# Patient Record
(demographics unavailable — no encounter records)

---

## 2024-10-16 NOTE — ASSESSMENT
[FreeTextEntry1] : Doing well.   Neurologically intact on exam today.  Shunt setting remains at 0.5.   I will see her back in 6 months.   Will call barring any issues.  Jazmin Valencia MS PA-C Senior Physician Assistant [facility]    eJanine Raygoza MD FAANS Chair, Department of Neurosurgery [facility]

## 2024-10-16 NOTE — HISTORY OF PRESENT ILLNESS
[FreeTextEntry1] : Ms. GHOTRA was last seen 8/2023.   S/p [employer]  shunt placement performed by Dr. Cheney in 2012. Postoperatively she has done well.    She presents today to have her  shunt setting checked. Current shunt setting is 0.5.   Shunt pumps and refills well. Well healed incisions. Gait is steady. Ambulating with a cane. No acute complaints.

## 2024-11-12 NOTE — HISTORY OF PRESENT ILLNESS
[de-identified] : Patient is a 60-year-old female here for reevaluation bilateral knee.  Denies numbness or tingling, denies significant debility.  Patient states that she still is pain comes and goes, doing well with cortisone injections.  Interested in repeat cortisone injections bilateral knees  b/l knee exam: No effusion no ecchymosis no erythema tenderness palpation to medial/lateral joint line, range of motion 0 degrees to 115 degrees of mild discomfort, no gross instability neurovascular intact  Reviewed prior knee x-rays in detail  Plan for cortisone injection b/l knee  Anesthesia: ethyl chloride sprayed topically.    Dexamethasone 10mg/mL 1 cc   Lidocaine 2% 2cc         Medication was injected in the b/l  knee after verbal consent using sterile preparation and technique. The risks, benefits, and alternatives to cortisone injection were explained in full to the patient. Risks outlined include but are not limited to infection, sepsis, bleeding, scarring, skin discoloration, temporary increase in pain, syncopal episode, failure to resolve symptoms, allergic reaction, symptom recurrence, and elevation of blood sugar in diabetics. Patient understood the risks. All questions were answered. After discussion of options, patient requested an injection. Oral informed consent was obtained and sterile prep was done of the injection site. Sterile technique was utilized for the procedure including the preparation of the solutions used for the injection. Patient tolerated the procedure well. Advised to ice the injection site this evening.  Patient will follow-up in 4 to 6 months for reevaluation.  Call if any questions or concerns.  Will discuss possible viscosupplementation injections.  Patient currently not interested in having Visco injections

## 2025-03-14 NOTE — HISTORY OF PRESENT ILLNESS
[de-identified] : Patient is a 61-year-old female here for reevaluation bilateral knee.  Denies numbness or tingling, denies significant debility.  Patient states that she still is pain comes and goes, doing well with cortisone injections, pain has been coming back for about a month or so.  Interested in repeat cortisone injections bilateral knees  b/l knee exam: No effusion no ecchymosis no erythema tenderness palpation to medial/lateral joint line, range of motion 0 degrees to 115 degrees of mild discomfort, no gross instability neurovascular intact  Reviewed prior knee x-rays in detail  Patient is having flareup of osteoarthritis.  Plan for cortisone injection b/l knee  Anesthesia: ethyl chloride sprayed topically.    Dexamethasone 10mg/mL 2 cc   Lidocaine 2% 2cc         Medication was injected in the b/l  knee after verbal consent using sterile preparation and technique. The risks, benefits, and alternatives to cortisone injection were explained in full to the patient. Risks outlined include but are not limited to infection, sepsis, bleeding, scarring, skin discoloration, temporary increase in pain, syncopal episode, failure to resolve symptoms, allergic reaction, symptom recurrence, and elevation of blood sugar in diabetics. Patient understood the risks. All questions were answered. After discussion of options, patient requested an injection. Oral informed consent was obtained and sterile prep was done of the injection site. Sterile technique was utilized for the procedure including the preparation of the solutions used for the injection. Patient tolerated the procedure well. Advised to ice the injection site this evening.  Patient will follow-up in 4 to 6 months for reevaluation.  Call if any questions or concerns.  Will discuss possible viscosupplementation injections.  Patient currently not interested in having Visco injections

## 2025-04-18 NOTE — HISTORY OF PRESENT ILLNESS
[de-identified] : Patient is a 61-year-old female here for evaluation of right knee.  Patient was last given cortisone injection approximately 1 month ago.  Patient states the injection only lasted about a week.  Patient states that she is here for further evaluation and discussion of treatment plan.  Denies numbness or tingling, patient states that sometimes the knee does buckle and give out wears knee sleeve for support.  Right knee exam: No effusion no ecchymosis no erythema tenderness palpation to medial/lateral joint line, range of motion 0 degrees to 115 degrees with discomfort, no gross instability neurovascular intact, calf soft nontender  Reviewed x-rays from chart of right knee in detail showing advanced tricompartmental osteoarthritis right knee  Patient is having flareup of osteoarthritis, last cortisone injection did not give patient relief.  At this point patient has failed conservative treatment including injections, oral medications, physical therapy.  Discussed further treatment options in detail patient including total knee replacement surgery versus pain management.  Patient states that she would like to think about it and follow-up.  Patient will call with any questions or concerns.  Patient understands agrees with plan

## 2025-04-24 NOTE — PROCEDURE
[FreeTextEntry1] : 1) Callus: aseptic debridement and paring of painful foot callus x 2 (Left and Right hallux) 2) Onychomycosis with elongated toenails with pain/discomfort: aseptic debridement of elongated mycotic toenails x 4 3) Flatfeet: loss of medial longitudinal arch with pronation, valgus deformity of heel, posterior tibial tendon dysfunctional, recommend use of proper fit shoes, importance of using arch supports/orthotics to prevent further arch collapse, applied supportive relief felt pad to medial longitudinal arch both Right and Left foot with elastic foot and ankle strap to stabilize feet from untoward pronatory forces and to reducing flattening of both arches, education patient on daily application and use  Patient tolerated all treatments well and without any complications

## 2025-04-24 NOTE — REVIEW OF SYSTEMS
[Joint Swelling] : no joint swelling [Joint Stiffness] : joint stiffness [Limb Swelling] : no limb swelling [Skin Lesions] : skin lesion [Skin Wound] : no skin wound [Itching] : no itching [Confused] : no confusion [Convulsions] : no convulsions [Dizziness] : no dizziness [Fainting] : no fainting [Negative] : Psychiatric

## 2025-04-24 NOTE — ASSESSMENT
[FreeTextEntry1] : 1) Callus: aseptic debridement and paring of painful foot callus x 2 (Left and Right hallux) 2) Onychomycosis with elongated toenails with pain/discomfort: aseptic debridement of elongated mycotic toenails x 4 3) Flatfeet: loss of medial longitudinal arch with pronation, valgus deformity of heel, posterior tibial tendon dysfunctional, recommend use of proper fit shoes, importance of using arch supports/orthotics to prevent further arch collapse, applied supportive relief felt pad to medial longitudinal arch both Right and Left foot with elastic foot and ankle strap to stabilize feet from untoward pronatory forces and to reducing flattening of both arches, education patient on daily application and use 4) Xerosis: recommend use of topical moisturizer BID  Patient tolerated all treatments well and without any complications Follow up in 2 months [Verbal] : verbal [Patient] : patient [Good - alert, interested, motivated] : Good - alert, interested, motivated [Verbalizes knowledge/Understanding] : Verbalizes knowledge/understanding [Pt responsibility to plan of care] : patient responsibility to plan of care [Other: ____] : [unfilled]

## 2025-04-24 NOTE — REASON FOR VISIT
[Follow-Up Visit] : a follow-up visit for [FreeTextEntry2] : calluses, onychomycosis, flatfeet with pronation of feet, xerosis

## 2025-04-24 NOTE — HISTORY OF PRESENT ILLNESS
[FreeTextEntry1] : Ms. SHASHA GHOTRA is a 61 year female who is seen in the office for calluses, onychomycosis, flatfeet with pronation of feet, xerosis. Patient denies any current or recent fever, chills, nausea, vomiting, SOB, or chest pain. AAOx3 and in NAD. Patient mentions she is going for Right knee surgery in June/July 2025.

## 2025-04-24 NOTE — PHYSICAL EXAM
[General Appearance - Alert] : alert [General Appearance - In No Acute Distress] : in no acute distress [Delayed in the Right Toes] : capillary refills normal in right toes [Delayed in the Left Toes] : capillary refills normal in the left toes [2+] : left foot dorsalis pedis 2+ [FreeTextEntry3] : skin temperature gradient is WNL to bilateral lower extremities [] : normal strength/tone [de-identified] : ROM of ankle, subtalar, midtarsal, MPJ, IPJ are adequate and nontender bilateral 5/5 muscle power in inversion, eversion, dorsiflexion, plantarflexion bilateral [Skin Turgor] : normal skin turgor [Foot Ulcer] : no foot ulcer [Position Sense Dec.] : diminished position sense at the level of the toes [FreeTextEntry1] : gross epicritic sensation to feet diminished, light touch sensation intact, sharp/dull sensation intact [Oriented To Time, Place, And Person] : oriented to person, place, and time [Impaired Insight] : insight and judgment were intact [Affect] : the affect was normal

## 2025-06-24 NOTE — HISTORY OF PRESENT ILLNESS
[de-identified] : Follow-up of knees.  61-year lady with follow-up of bilateral knees, she has arthritis. Really not very much pain. She does report some stiffness. Did not get much improvement after injections. We discussed replacement surgery. At this point she is not quite ready. She does have bilateral pes planus. Recommend at this point that she hold off and treat nonoperatively. She will follow-up with me in 6 months.

## 2025-07-15 NOTE — HISTORY OF PRESENT ILLNESS
[de-identified] : Patient is a 61-year-old female here for reevaluation bilateral knees, has had cortisone injection in the past with mild relief.  Patient states at this point she feels stable, has recently just seen Dr. Cesar Agosto patient states that she is thinking about moving forward with total knee replacement although she still would like to think about it.  Bilateral knee exam:: No effusion no ecchymosis no erythema tenderness palpation to medial/lateral joint line, range of motion 0 degrees to 115 degrees with discomfort, no gross instability neurovascular intact, calf soft nontender  Reviewed x-rays from chart of bilateral knees showing advanced osteoarthritis bilaterally  Patient at this point states that she is comfortable and pain is tolerable, does not want to think of having knee replacement will follow-up as needed

## 2025-07-17 NOTE — ASSESSMENT
[FreeTextEntry1] : 1) Callus: aseptic debridement and paring of painful foot callus x 2 (Left and Right hallux) done 07/16/2025 2) Onychomycosis with onychodystrophy/onychogryphosis: aseptic debridement of thick/elongated, incurvated, mycotic and dystrophic toenails with pain/discomfort x 4 was done 07/16/2025 3) Flatfeet: loss of medial longitudinal arch with pronation, valgus deformity of heel, posterior tibial tendon dysfunctional, recommend use of proper fit shoes, importance of using arch supports/orthotics to prevent further arch collapse, continue use of supportive relief felt pad to medial longitudinal arch both Right and Left foot with elastic foot and ankle strap to stabilize feet from untoward pronatory forces and to reducing flattening of both arches, education patient on daily application and use 4) Xerosis: recommend use of topical moisturizer BID  Patient tolerated all treatments well and without any complications Follow up in 2 months    [Verbal] : verbal [Patient] : patient [Good - alert, interested, motivated] : Good - alert, interested, motivated [Verbalizes knowledge/Understanding] : Verbalizes knowledge/understanding [Pt responsibility to plan of care] : patient responsibility to plan of care

## 2025-07-17 NOTE — HISTORY OF PRESENT ILLNESS
[FreeTextEntry1] : Ms. SHASHA GHOTRA is a 61 year female who is seen in the office for calluses, onychomycosis, flatfeet with pronation of feet, xerosis. Patient denies any current or recent fever, chills, nausea, vomiting, SOB, or chest pain. AAOx3 and in NAD.   Patient mentions she postponed her Right knee surgery

## 2025-07-17 NOTE — PROCEDURE
[FreeTextEntry1] : 1) Callus: aseptic debridement and paring of painful foot callus x 2 (Left and Right hallux) done 07/16/2025 2) Onychomycosis with onychodystrophy/onychogryphosis: aseptic debridement of thick/elongated, incurvated, mycotic and dystrophic toenails with pain/discomfort x 4 was done 07/16/2025   Patient tolerated all treatments well and without any complications

## 2025-07-17 NOTE — PHYSICAL EXAM
[General Appearance - Alert] : alert [General Appearance - In No Acute Distress] : in no acute distress [Delayed in the Right Toes] : capillary refills normal in right toes [Delayed in the Left Toes] : capillary refills normal in the left toes [2+] : left foot dorsalis pedis 2+ [FreeTextEntry3] : skin temperature gradient is WNL to bilateral lower extremities [] : normal strength/tone [de-identified] : ROM of ankle, subtalar, midtarsal, MPJ, IPJ are adequate bilateral 5/5 muscle power in inversion, eversion, dorsiflexion, plantarflexion bilateral [Skin Turgor] : normal skin turgor [Foot Ulcer] : no foot ulcer [FreeTextEntry1] : gross epicritic sensation to feet diminished, light touch sensation intact, sharp/dull sensation intact [Oriented To Time, Place, And Person] : oriented to person, place, and time [Impaired Insight] : insight and judgment were intact [Affect] : the affect was normal